# Patient Record
Sex: MALE | Race: WHITE | URBAN - METROPOLITAN AREA
[De-identification: names, ages, dates, MRNs, and addresses within clinical notes are randomized per-mention and may not be internally consistent; named-entity substitution may affect disease eponyms.]

---

## 2023-03-31 ENCOUNTER — OFFICE VISIT (OUTPATIENT)
Dept: URBAN - METROPOLITAN AREA CLINIC 5 | Facility: CLINIC | Age: 33
End: 2023-03-31

## 2023-03-31 DIAGNOSIS — T15.01XA FOREIGN BODY IN CORNEA, RIGHT EYE: Primary | ICD-10-CM

## 2023-03-31 PROCEDURE — 92002 INTRM OPH EXAM NEW PATIENT: CPT | Performed by: OPTOMETRIST

## 2023-03-31 RX ORDER — NEOMYCIN SULFATE, POLYMYXIN B SULFATE AND DEXAMETHASONE 3.5; 10000; 1 MG/G; [USP'U]/G; MG/G
OINTMENT OPHTHALMIC
Qty: 3.5 | Refills: 0 | Status: ACTIVE
Start: 2023-03-31

## 2023-03-31 ASSESSMENT — INTRAOCULAR PRESSURE
OD: 14
OS: 16

## 2023-03-31 NOTE — IMPRESSION/PLAN
Impression: Foreign body in cornea, right eye: T15.01xA. Plan: FB scar with epi defect 1 mm in visual axis inferiorly. No FB in cornea today. Patient was a ER and was probably removed. Start Maxitrol ELAN QID OD. Discussed the importance of safety goggles. Recheck in one week / prn with  for dilated examination.

## 2023-04-11 ENCOUNTER — OFFICE VISIT (OUTPATIENT)
Dept: URBAN - METROPOLITAN AREA CLINIC 5 | Facility: CLINIC | Age: 33
End: 2023-04-11

## 2023-04-11 DIAGNOSIS — T15.01XA FOREIGN BODY IN CORNEA, RIGHT EYE: Primary | ICD-10-CM

## 2023-04-11 PROCEDURE — 92012 INTRM OPH EXAM EST PATIENT: CPT | Performed by: OPTOMETRIST

## 2023-04-11 ASSESSMENT — INTRAOCULAR PRESSURE
OD: 14
OS: 13

## 2023-04-11 ASSESSMENT — VISUAL ACUITY
OS: 20/20
OD: 20/20

## 2023-04-11 NOTE — IMPRESSION/PLAN
Impression: Foreign body in cornea, right eye: T15.01xA. Plan: FB scar in visual axis inferiorly - Continue Maxitrol ELAN QID OD. Re-discussed the importance of safety goggles. Hold off on prescription glasses at this time as inflammation still present and healing is still process. Dilated medial examination was performed today and was unremarkable. Recheck cornea and refraction in 2-3 weeks.

## 2023-05-02 ENCOUNTER — OFFICE VISIT (OUTPATIENT)
Dept: URBAN - METROPOLITAN AREA CLINIC 5 | Facility: CLINIC | Age: 33
End: 2023-05-02

## 2023-05-02 DIAGNOSIS — H52.223 REGULAR ASTIGMATISM, BILATERAL: Primary | ICD-10-CM

## 2023-05-02 ASSESSMENT — VISUAL ACUITY
OD: 20/20
OS: 20/20

## 2023-05-02 ASSESSMENT — INTRAOCULAR PRESSURE
OS: 12
OD: 11